# Patient Record
Sex: FEMALE | Race: WHITE | URBAN - METROPOLITAN AREA
[De-identification: names, ages, dates, MRNs, and addresses within clinical notes are randomized per-mention and may not be internally consistent; named-entity substitution may affect disease eponyms.]

---

## 2019-01-09 ENCOUNTER — APPOINTMENT (RX ONLY)
Dept: URBAN - METROPOLITAN AREA CLINIC 39 | Facility: CLINIC | Age: 54
Setting detail: DERMATOLOGY
End: 2019-01-09

## 2019-01-09 DIAGNOSIS — L81.7 PIGMENTED PURPURIC DERMATOSIS: ICD-10-CM

## 2019-01-09 DIAGNOSIS — L57.8 OTHER SKIN CHANGES DUE TO CHRONIC EXPOSURE TO NONIONIZING RADIATION: ICD-10-CM

## 2019-01-09 DIAGNOSIS — L73.8 OTHER SPECIFIED FOLLICULAR DISORDERS: ICD-10-CM

## 2019-01-09 DIAGNOSIS — L82.1 OTHER SEBORRHEIC KERATOSIS: ICD-10-CM

## 2019-01-09 DIAGNOSIS — L91.8 OTHER HYPERTROPHIC DISORDERS OF THE SKIN: ICD-10-CM

## 2019-01-09 DIAGNOSIS — L81.4 OTHER MELANIN HYPERPIGMENTATION: ICD-10-CM

## 2019-01-09 DIAGNOSIS — D18.0 HEMANGIOMA: ICD-10-CM

## 2019-01-09 DIAGNOSIS — D22 MELANOCYTIC NEVI: ICD-10-CM

## 2019-01-09 PROBLEM — I10 ESSENTIAL (PRIMARY) HYPERTENSION: Status: ACTIVE | Noted: 2019-01-09

## 2019-01-09 PROBLEM — D22.5 MELANOCYTIC NEVI OF TRUNK: Status: ACTIVE | Noted: 2019-01-09

## 2019-01-09 PROBLEM — D48.5 NEOPLASM OF UNCERTAIN BEHAVIOR OF SKIN: Status: ACTIVE | Noted: 2019-01-09

## 2019-01-09 PROBLEM — D18.01 HEMANGIOMA OF SKIN AND SUBCUTANEOUS TISSUE: Status: ACTIVE | Noted: 2019-01-09

## 2019-01-09 PROCEDURE — ? COUNSELING

## 2019-01-09 PROCEDURE — 99203 OFFICE O/P NEW LOW 30 MIN: CPT | Mod: 25

## 2019-01-09 PROCEDURE — ? BIOPSY BY SHAVE METHOD

## 2019-01-09 PROCEDURE — ? PRESCRIPTION

## 2019-01-09 PROCEDURE — 11102 TANGNTL BX SKIN SINGLE LES: CPT

## 2019-01-09 RX ORDER — TRETINOIN 1 MG/G
CREAM TOPICAL QHS
Qty: 1 | Refills: 3 | Status: ERX | COMMUNITY
Start: 2019-01-09

## 2019-01-09 RX ORDER — TRIAMCINOLONE ACETONIDE 1 MG/G
CREAM TOPICAL BID
Qty: 1 | Refills: 1 | Status: ERX | COMMUNITY
Start: 2019-01-09

## 2019-01-09 RX ADMIN — TRETINOIN 1: 1 CREAM TOPICAL at 00:00

## 2019-01-09 RX ADMIN — TRIAMCINOLONE ACETONIDE 1: 1 CREAM TOPICAL at 00:00

## 2019-01-09 ASSESSMENT — LOCATION DETAILED DESCRIPTION DERM
LOCATION DETAILED: LEFT SUPERIOR LATERAL MIDBACK
LOCATION DETAILED: LEFT DISTAL PRETIBIAL REGION
LOCATION DETAILED: LEFT INFERIOR CENTRAL MALAR CHEEK
LOCATION DETAILED: RIGHT DISTAL PRETIBIAL REGION
LOCATION DETAILED: RIGHT MEDIAL UPPER BACK
LOCATION DETAILED: LEFT MEDIAL MALAR CHEEK
LOCATION DETAILED: RIGHT SUPERIOR MEDIAL MIDBACK
LOCATION DETAILED: RIGHT ANTERIOR SHOULDER
LOCATION DETAILED: RIGHT INFERIOR CENTRAL MALAR CHEEK
LOCATION DETAILED: LEFT SUPERIOR MEDIAL UPPER BACK

## 2019-01-09 ASSESSMENT — LOCATION SIMPLE DESCRIPTION DERM
LOCATION SIMPLE: RIGHT SHOULDER
LOCATION SIMPLE: RIGHT LOWER BACK
LOCATION SIMPLE: LEFT PRETIBIAL REGION
LOCATION SIMPLE: RIGHT PRETIBIAL REGION
LOCATION SIMPLE: RIGHT UPPER BACK
LOCATION SIMPLE: LEFT CHEEK
LOCATION SIMPLE: LEFT UPPER BACK
LOCATION SIMPLE: RIGHT CHEEK
LOCATION SIMPLE: LEFT LOWER BACK

## 2019-01-09 ASSESSMENT — SEVERITY ASSESSMENT: SEVERITY: MILD

## 2019-01-09 ASSESSMENT — LOCATION ZONE DERM
LOCATION ZONE: ARM
LOCATION ZONE: FACE
LOCATION ZONE: LEG
LOCATION ZONE: TRUNK

## 2019-01-09 ASSESSMENT — PAIN INTENSITY VAS: HOW INTENSE IS YOUR PAIN 0 BEING NO PAIN, 10 BEING THE MOST SEVERE PAIN POSSIBLE?: NO PAIN

## 2019-01-09 NOTE — PROCEDURE: BIOPSY BY SHAVE METHOD
Additional Anesthesia Volume In Cc (Will Not Render If 0): 0
Anesthesia Type: 1% lidocaine without epinephrine
Electrodesiccation Text: The wound bed was treated with electrodesiccation after the biopsy was performed.
Depth Of Biopsy: dermis
Wound Care: Vaseline
Bill 30455 For Specimen Handling/Conveyance To Laboratory?: no
Lab Facility: [id number]
Render Post-Care Instructions In Note?: yes
Type Of Destruction Used: Curettage
Biopsy Type: H and E
Anesthesia Volume In Cc (Will Not Render If 0): 0.6
Electrodesiccation And Curettage Text: The wound bed was treated with electrodesiccation and curettage after the biopsy was performed.
Billing Type: Third-Party Bill
Post-Care Instructions: I reviewed with the patient in detail post-care instructions. Patient is to keep the biopsy site dry overnight, and then apply vaseline twice daily until healed.
Silver Nitrate Text: The wound bed was treated with silver nitrate after the biopsy was performed.
Dressing: no dressing applied
Hemostasis: Drysol
Lab: [id number]
Detail Level: Detailed
Biopsy Method: Personna blade
Notification Instructions: Patient will be notified of biopsy results. However, patient instructed to call the office if not contacted within 2 weeks.
Curettage Text: The wound bed was treated with curettage after the biopsy was performed.
Consent: Verbal consent was obtained and risks were reviewed including but not limited to scarring, infection, bleeding, scabbing, incomplete removal, nerve damage and allergy to anesthesia.
Cryotherapy Text: The wound bed was treated with cryotherapy after the biopsy was performed.

## 2022-04-08 ENCOUNTER — HOSPITAL ENCOUNTER (OUTPATIENT)
Dept: RADIOLOGY | Facility: HOSPITAL | Age: 57
Discharge: HOME | End: 2022-04-08
Attending: FAMILY MEDICINE
Payer: COMMERCIAL

## 2022-04-08 ENCOUNTER — OFFICE VISIT (OUTPATIENT)
Dept: FAMILY MEDICINE | Facility: CLINIC | Age: 57
End: 2022-04-08
Payer: COMMERCIAL

## 2022-04-08 VITALS
TEMPERATURE: 97.7 F | DIASTOLIC BLOOD PRESSURE: 92 MMHG | RESPIRATION RATE: 17 BRPM | HEART RATE: 97 BPM | OXYGEN SATURATION: 99 % | SYSTOLIC BLOOD PRESSURE: 130 MMHG

## 2022-04-08 DIAGNOSIS — E03.9 HYPOTHYROIDISM, UNSPECIFIED TYPE: ICD-10-CM

## 2022-04-08 DIAGNOSIS — M79.605 LEFT LEG PAIN: ICD-10-CM

## 2022-04-08 DIAGNOSIS — Z00.00 ENCOUNTER FOR WELLNESS EXAMINATION: Primary | ICD-10-CM

## 2022-04-08 PROBLEM — E78.2 MIXED HYPERLIPIDEMIA: Status: ACTIVE | Noted: 2022-04-08

## 2022-04-08 PROBLEM — I10 PRIMARY HYPERTENSION: Status: ACTIVE | Noted: 2018-10-18

## 2022-04-08 PROBLEM — M87.059 AVASCULAR NECROSIS OF FEMORAL HEAD (CMS/HCC): Status: ACTIVE | Noted: 2022-04-08

## 2022-04-08 PROBLEM — J45.20 MILD INTERMITTENT ASTHMA: Status: ACTIVE | Noted: 2022-04-08

## 2022-04-08 PROBLEM — G47.9 SLEEP DISORDER: Status: ACTIVE | Noted: 2022-04-08

## 2022-04-08 PROBLEM — Z90.13 HISTORY OF BILATERAL MASTECTOMY: Status: ACTIVE | Noted: 2018-12-17

## 2022-04-08 PROCEDURE — 90471 IMMUNIZATION ADMIN: CPT | Performed by: FAMILY MEDICINE

## 2022-04-08 PROCEDURE — 90750 HZV VACC RECOMBINANT IM: CPT | Performed by: FAMILY MEDICINE

## 2022-04-08 PROCEDURE — 73610 X-RAY EXAM OF ANKLE: CPT | Mod: LT

## 2022-04-08 PROCEDURE — 99386 PREV VISIT NEW AGE 40-64: CPT | Mod: 25 | Performed by: FAMILY MEDICINE

## 2022-04-08 PROCEDURE — 3075F SYST BP GE 130 - 139MM HG: CPT | Performed by: FAMILY MEDICINE

## 2022-04-08 PROCEDURE — 3080F DIAST BP >= 90 MM HG: CPT | Performed by: FAMILY MEDICINE

## 2022-04-08 PROCEDURE — 73590 X-RAY EXAM OF LOWER LEG: CPT | Mod: LT

## 2022-04-08 RX ORDER — LEVOTHYROXINE SODIUM 88 UG/1
TABLET ORAL
COMMUNITY
Start: 2015-09-07 | End: 2022-04-08 | Stop reason: SDUPTHER

## 2022-04-08 RX ORDER — FLUCONAZOLE 150 MG/1
TABLET ORAL
COMMUNITY
Start: 2022-01-30 | End: 2022-04-08 | Stop reason: ALTCHOICE

## 2022-04-08 RX ORDER — CLONIDINE HYDROCHLORIDE 0.1 MG/1
TABLET ORAL
COMMUNITY
End: 2024-01-26 | Stop reason: SDUPTHER

## 2022-04-08 RX ORDER — TRAZODONE HYDROCHLORIDE 100 MG/1
TABLET ORAL
COMMUNITY
Start: 2022-03-23 | End: 2023-09-14 | Stop reason: SDUPTHER

## 2022-04-08 RX ORDER — VENLAFAXINE 100 MG/1
150 TABLET ORAL 2 TIMES DAILY
COMMUNITY
Start: 2015-04-07 | End: 2023-08-04 | Stop reason: SDUPTHER

## 2022-04-08 RX ORDER — ALBUTEROL SULFATE 90 UG/1
INHALANT RESPIRATORY (INHALATION)
COMMUNITY
Start: 2015-09-07 | End: 2022-11-30

## 2022-04-08 RX ORDER — TIZANIDINE 4 MG/1
4 TABLET ORAL EVERY 6 HOURS PRN
COMMUNITY
End: 2022-10-19

## 2022-04-08 RX ORDER — METOPROLOL SUCCINATE 25 MG/1
25 TABLET, EXTENDED RELEASE ORAL DAILY
COMMUNITY
End: 2022-04-08 | Stop reason: CLARIF

## 2022-04-08 RX ORDER — LORAZEPAM 0.5 MG/1
TABLET ORAL
COMMUNITY
Start: 2015-04-07 | End: 2023-02-07 | Stop reason: SDUPTHER

## 2022-04-08 RX ORDER — LOSARTAN POTASSIUM 100 MG/1
TABLET ORAL
COMMUNITY
Start: 2015-09-07 | End: 2022-05-13 | Stop reason: SDUPTHER

## 2022-04-08 RX ORDER — METOPROLOL TARTRATE 25 MG/1
TABLET, FILM COATED ORAL 2 TIMES DAILY
COMMUNITY
End: 2022-04-15 | Stop reason: SDUPTHER

## 2022-04-08 RX ORDER — LEVOTHYROXINE SODIUM 88 UG/1
TABLET ORAL
Qty: 90 TABLET | Refills: 0 | Status: SHIPPED | OUTPATIENT
Start: 2022-04-08 | End: 2022-05-13 | Stop reason: SDUPTHER

## 2022-04-08 NOTE — PATIENT INSTRUCTIONS
Let us know what you would like to do with PAP tests moving forward     Have blood work done at your earliest convenience, we will contact you with results.  You should be fasting for the labs, do not eat anything for at least 8 to 10 hours prior to when you'll have the labs drawn, it is okay to drink water during your fasting period.

## 2022-04-08 NOTE — PROGRESS NOTES
Subjective      Patient ID: Marita Calderon is a 56 y.o. female is here for the following:    Patient presents for a healthcare maintenance visit.    She denies current symptoms including HA, CP, palpitations, SOB, N/V/D, urinary symptoms    PMH   There is no problem list on file for this patient.    Past Surgical History:  No date: HYSTERECTOMY  No date: MASTECTOMY  No date: TONSILLECTOMY  Medications: see med list  Allergies: see allergy list    No family history on file.    Social history    Tobacco?: No    Alcohol?: Yes - Socially  Illicit drugs: No  Work: Travels with     Marital status/family:    Diet: Balanced  Number of days/week of moderate or greater intensity exercise?: 0   On those days, minutes spent at that activity level?: 0    Eye Doctor: Sees regularly       Dentist: Sees regularly     Screenings  Colonoscopy/FIT (45-74 y/o): up to date - cologuard negative last year     Mammogram: declined  PAP (21-66 y/o): declined today  DEXA: N/A    Lung CA screening (55-79 y/o w/ 30 pack-year hx AND currently smoke OR quit within 15 years): N/A     Labs  Lipids: ordered today   FBS/a1c: ordered today                                                Vaccines     Influenza: Up to date  Tdap: Up to date  Pneumovax: N/A Prevnar: N/A      Shingrix/Zostavax (50+): Overdue - ordered today   COVID: Up to date      The following have been reviewed and updated as appropriate in this visit:            There is no problem list on file for this patient.      Social History     Tobacco Use   • Smoking status: Former Smoker     Packs/day: 1.00     Years: 25.00     Pack years: 25.00     Quit date:      Years since quittin.2   • Smokeless tobacco: Never Used   Substance Use Topics   • Alcohol use: Yes     Comment: socially   • Drug use: Never       Allergies as of 2022 - Reviewed 2022   Allergen Reaction Noted   • Codeine  2022         Current Outpatient Medications:   •  albuterol HFA  (VENTOLIN HFA) 90 mcg/actuation inhaler, ProAir HFA 90 mcg/actuation aerosol inhaler  INHALE 2 PUFFS BY MOUTH EVERY 4 HOURS, Disp: , Rfl:   •  cloNIDine (CATAPRES) 0.1 mg tablet, clonidine HCl 0.1 mg tablet, Disp: , Rfl:   •  fluconazole (DIFLUCAN) 150 mg tablet, TAKE 1 TABLET BY MOUTH ONCE, Disp: , Rfl:   •  levothyroxine (SYNTHROID) 88 mcg tablet, levothyroxine 88 mcg tablet, Disp: , Rfl:   •  LORazepam (ATIVAN) 0.5 mg tablet, lorazepam 0.5 mg tablet, Disp: , Rfl:   •  losartan (COZAAR) 100 mg tablet, losartan 100 mg tablet, Disp: , Rfl:   •  metoprolol succinate XL (TOPROL-XL) 25 mg 24 hr tablet, Take 25 mg by mouth daily., Disp: , Rfl:   •  tiZANidine (ZANAFLEX) 4 mg tablet, Take 4 mg by mouth every 6 (six) hours as needed for muscle spasms., Disp: , Rfl:   •  traZODone (DESYREL) 100 mg tablet, , Disp: , Rfl:   •  venlafaxine (EFFEXOR) 100 mg tablet, , Disp: , Rfl:     Review of systems  Per HPI.    Objective   Vitals:    04/08/22 1137   BP: (!) 130/92   BP Location: Left upper arm   Patient Position: Sitting   Pulse: 97   Resp: 17   Temp: 36.5 °C (97.7 °F)   TempSrc: Temporal   SpO2: 99%     There is no height or weight on file to calculate BMI.    Physical Exam  Vitals reviewed.   Constitutional:       General: She is not in acute distress.     Appearance: She is well-developed.   HENT:      Right Ear: Tympanic membrane, ear canal and external ear normal.      Left Ear: Tympanic membrane, ear canal and external ear normal.   Eyes:      Pupils: Pupils are equal, round, and reactive to light.      Funduscopic exam:     Right eye: No hemorrhage or papilledema.         Left eye: No hemorrhage or papilledema.   Neck:      Thyroid: No thyromegaly.   Cardiovascular:      Rate and Rhythm: Normal rate and regular rhythm.      Heart sounds: Normal heart sounds.   Pulmonary:      Effort: Pulmonary effort is normal. No respiratory distress.      Breath sounds: Normal breath sounds.   Abdominal:      General: Bowel sounds  are normal. There is no distension.      Palpations: Abdomen is soft.      Tenderness: There is no abdominal tenderness.   Musculoskeletal:      Cervical back: Normal range of motion and neck supple.   Skin:     General: Skin is warm and dry.      Capillary Refill: Capillary refill takes less than 2 seconds.      Findings: No erythema.   Psychiatric:         Mood and Affect: Mood normal.         Behavior: Behavior normal.         Thought Content: Thought content normal.         Judgment: Judgment normal.       No images are attached to the encounter.     Assessment/Plan   Problem List Items Addressed This Visit        Endocrine/Metabolic    Hypothyroidism  Has been irregularly taking mediation recently  Asymptomatic     -Check labs     Relevant Medications    metoprolol tartrate (LOPRESSOR) 25 mg tablet    levothyroxine (SYNTHROID) 88 mcg tablet    Other Relevant Orders    TSH w reflex FT4      Other Visit Diagnoses     Encounter for wellness examination    -  Primary  Feels well overall, no new complaints   No medication changes   Up to date on vaccines - due for PAP  Screening labs ordered    -Follow-up yearly for wellness exams and sooner as needed      Relevant Orders    CBC and Differential    Comprehensive metabolic panel    Lipid panel    Left leg pain        Relevant Orders    X-RAY ANKLE LEFT 3+ VIEWS    X-RAY TIBIA FIBULA LEFT 2 VIEWS          I spent 30 minutes on this date of service performing the following activities: obtaining history, performing examination, entering orders, documenting and providing counseling and education.      Jack Hensley MD  4/8/2022

## 2022-04-14 ENCOUNTER — TELEPHONE (OUTPATIENT)
Dept: FAMILY MEDICINE | Facility: CLINIC | Age: 57
End: 2022-04-14
Payer: COMMERCIAL

## 2022-04-14 DIAGNOSIS — I10 PRIMARY HYPERTENSION: Primary | ICD-10-CM

## 2022-04-14 NOTE — TELEPHONE ENCOUNTER
Pt needs refill on metoprolol, only has two days remaining of medication.Please sen script to [facility]    Do you have enough medication for the next 5 days?:     Did you request this medication through your pharmacy or our patient portal in the last day or two?    Name of medication requested: metoprolol tartrate (LOPRESSOR)   Medication Strength: 25 mg tablet  Mediation Directions:   Quantity Requested (example 30/90):   Number of refills requested:       System Generated Preferred Pharmacy(s)  are as follows.  If more than one pharmacy displays please identify which one should be used for this call    Has the pharmacy information below been confirmed with the patient?         [id number] - [place], [place] - [address] [address]  Phone: [phone] Fax: [phone]    [id number] - [place] - [address] [facility] [address]  [address] [address]  Phone: [phone] Fax: [phone]          If necessary, provide pharmacy details below.     Is this pharmacy a mail order pharmacy?:   Pharmacy Name:   Pharmacy City:   Pharmacy Telephone:     Additional Comments:    Next Encounter with this provider: Visit date not found

## 2022-04-15 RX ORDER — METOPROLOL TARTRATE 25 MG/1
25 TABLET, FILM COATED ORAL 2 TIMES DAILY
Qty: 180 TABLET | Refills: 3 | Status: SHIPPED | OUTPATIENT
Start: 2022-04-15 | End: 2022-05-13 | Stop reason: SDUPTHER

## 2022-05-12 ENCOUNTER — TELEPHONE (OUTPATIENT)
Dept: FAMILY MEDICINE | Facility: CLINIC | Age: 57
End: 2022-05-12
Payer: COMMERCIAL

## 2022-05-12 DIAGNOSIS — I10 PRIMARY HYPERTENSION: ICD-10-CM

## 2022-05-12 DIAGNOSIS — E03.9 HYPOTHYROIDISM, UNSPECIFIED TYPE: Primary | ICD-10-CM

## 2022-05-12 NOTE — TELEPHONE ENCOUNTER
Has 5 days worth    Did not requested through the pharmacy or portal      Losartan  100mg  One daily in AM    Levothyroxine  .088mg  One daily in AM      [facility] #[id number] - [place] - [address] AT [facility] [address]  [address] [address]  Phone: [phone] Fax: [phone]

## 2022-05-13 ENCOUNTER — TELEPHONE (OUTPATIENT)
Dept: FAMILY MEDICINE | Facility: CLINIC | Age: 57
End: 2022-05-13
Payer: COMMERCIAL

## 2022-05-13 DIAGNOSIS — E03.9 HYPOTHYROIDISM, UNSPECIFIED TYPE: ICD-10-CM

## 2022-05-13 DIAGNOSIS — I10 PRIMARY HYPERTENSION: ICD-10-CM

## 2022-05-13 RX ORDER — LOSARTAN POTASSIUM 100 MG/1
TABLET ORAL
Qty: 90 TABLET | Refills: 3 | Status: SHIPPED | OUTPATIENT
Start: 2022-05-13 | End: 2022-11-30 | Stop reason: SDUPTHER

## 2022-05-13 RX ORDER — LEVOTHYROXINE SODIUM 88 UG/1
88 TABLET ORAL
Qty: 90 TABLET | Refills: 3 | Status: SHIPPED | OUTPATIENT
Start: 2022-05-13 | End: 2022-11-30 | Stop reason: SDUPTHER

## 2022-05-13 RX ORDER — LEVOTHYROXINE SODIUM 88 UG/1
TABLET ORAL
Qty: 90 TABLET | Refills: 3 | Status: SHIPPED | OUTPATIENT
Start: 2022-05-13 | End: 2022-05-13 | Stop reason: SDUPTHER

## 2022-05-13 RX ORDER — METOPROLOL TARTRATE 25 MG/1
25 TABLET, FILM COATED ORAL 2 TIMES DAILY
Qty: 180 TABLET | Refills: 3 | Status: SHIPPED | OUTPATIENT
Start: 2022-05-13 | End: 2022-11-30 | Stop reason: SDUPTHER

## 2022-05-16 ENCOUNTER — OFFICE VISIT (OUTPATIENT)
Dept: FAMILY MEDICINE | Facility: CLINIC | Age: 57
End: 2022-05-16
Payer: COMMERCIAL

## 2022-05-16 VITALS
HEART RATE: 84 BPM | TEMPERATURE: 98.3 F | OXYGEN SATURATION: 99 % | DIASTOLIC BLOOD PRESSURE: 78 MMHG | HEIGHT: 65 IN | SYSTOLIC BLOOD PRESSURE: 122 MMHG | WEIGHT: 172.2 LBS | BODY MASS INDEX: 28.69 KG/M2 | RESPIRATION RATE: 16 BRPM

## 2022-05-16 DIAGNOSIS — Z12.31 ENCOUNTER FOR SCREENING MAMMOGRAM FOR MALIGNANT NEOPLASM OF BREAST: ICD-10-CM

## 2022-05-16 DIAGNOSIS — Z01.419 ENCOUNTER FOR GYNECOLOGICAL EXAMINATION: Primary | ICD-10-CM

## 2022-05-16 DIAGNOSIS — Z12.4 CERVICAL CANCER SCREENING: ICD-10-CM

## 2022-05-16 PROCEDURE — S0612 ANNUAL GYNECOLOGICAL EXAMINA: HCPCS | Performed by: FAMILY MEDICINE

## 2022-05-16 NOTE — PROGRESS NOTES
Subjective      Patient ID: Marita Calderon is a 56 y.o. female is here for the following:    Women's wellness exam  - History of hysterectomy in her 20s, she is unsure if she has her cervix but states that she did have an oophorectomy at the time  - No breast cancer history, though she does have fibrocystic disease and has had bilateral breast implants since she was in her 20s.  She states that she cannot tolerate normal mammograms.  - Currently postmenopausal  - Occasional urinary incontinence that is worsening      The following have been reviewed and updated as appropriate in this visit:   Tobacco  Allergies  Meds  Problems  Med Hx  Surg Hx  Fam Hx           Patient Active Problem List   Diagnosis   • Sleep disorder   • Avascular necrosis of femoral head (CMS/HCC)   • History of bilateral mastectomy   • Primary hypertension   • Hypothyroidism   • Mild intermittent asthma   • Mixed hyperlipidemia       Social History     Tobacco Use   • Smoking status: Former Smoker     Packs/day: 1.00     Years: 25.00     Pack years: 25.00     Quit date:      Years since quittin.3   • Smokeless tobacco: Never Used   Substance Use Topics   • Alcohol use: Yes     Comment: socially   • Drug use: Never       Allergies as of 2022 - Reviewed 2022   Allergen Reaction Noted   • Codeine  2022         Current Outpatient Medications:   •  albuterol HFA (VENTOLIN HFA) 90 mcg/actuation inhaler, ProAir HFA 90 mcg/actuation aerosol inhaler  INHALE 2 PUFFS BY MOUTH EVERY 4 HOURS, Disp: , Rfl:   •  cloNIDine (CATAPRES) 0.1 mg tablet, clonidine HCl 0.1 mg tablet, Disp: , Rfl:   •  levothyroxine (SYNTHROID) 88 mcg tablet, Take 1 tablet (88 mcg total) by mouth daily. levothyroxine 88 mcg tablet, Disp: 90 tablet, Rfl: 3  •  LORazepam (ATIVAN) 0.5 mg tablet, lorazepam 0.5 mg tablet, Disp: , Rfl:   •  losartan (COZAAR) 100 mg tablet, losartan 100 mg tablet, Disp: 90 tablet, Rfl: 3  •  metoprolol tartrate (LOPRESSOR) 25 mg  "tablet, Take 1 tablet (25 mg total) by mouth 2 (two) times a day., Disp: 180 tablet, Rfl: 3  •  tiZANidine (ZANAFLEX) 4 mg tablet, Take 4 mg by mouth every 6 (six) hours as needed for muscle spasms., Disp: , Rfl:   •  traZODone (DESYREL) 100 mg tablet, , Disp: , Rfl:   •  venlafaxine (EFFEXOR) 100 mg tablet, 150 mg 2 (two) times a day. , Disp: , Rfl:     Review of systems  Per HPI.    Objective   Vitals:    05/16/22 1600   BP: 122/78   BP Location: Left upper arm   Patient Position: Sitting   Pulse: 84   Resp: 16   Temp: 36.8 °C (98.3 °F)   TempSrc: Temporal   SpO2: 99%   Weight: 78.1 kg (172 lb 3.2 oz)   Height: 1.66 m (5' 5.35\")     Body mass index is 28.35 kg/m².    Physical Exam  Vitals reviewed. Exam conducted with a chaperone present (Marlene Louis present for GYN exam and breast exam).   Constitutional:       General: She is not in acute distress.     Appearance: Normal appearance. She is normal weight. She is not ill-appearing or toxic-appearing.   Genitourinary:     General: Normal vulva.      Vagina: No vaginal discharge.      Comments: Cervix not clearly visualized secondary to discomfort  Skin:     Comments: Breasts normal bilaterally, implants palpated bilaterally  No overlying skin changes, nipple discharge, nipple inversion   Neurological:      Mental Status: She is alert.       No images are attached to the encounter.     Assessment/Plan   Problem List Items Addressed This Visit    None     Visit Diagnoses     Encounter for gynecological examination    -  Primary  Normal breast exam  Cervix not clearly identified secondary discomfort during exam    - Pap test sent, we will follow-up with results and she will follow-up with GYN if needed given her complicated history    Encounter for screening mammogram for malignant neoplasm of breast        Relevant Orders    BI SCREENING MAMMOGRAM BILATERAL(TOMOSYNTHESIS)    Cervical cancer screening        Relevant Orders    Cytology, Thinprep Pap          I " spent 30 minutes on this date of service performing the following activities: obtaining history, performing examination, entering orders, documenting and providing counseling and education.      Jack Hensley MD  5/16/2022    Portions of the above dictation were performed using Dragon dictation software.  Please excuse any typos or grammatical errors

## 2022-05-19 LAB
CYTOLOGIST CVX/VAG CYTO: NORMAL
CYTOLOGY CVX/VAG DOC CYTO: NORMAL
CYTOLOGY CVX/VAG DOC THIN PREP: NORMAL
DX ICD CODE: NORMAL
HPV I/H RISK 4 DNA CVX QL PROBE+SIG AMP: NEGATIVE
LAB CORP NOTE:: NORMAL
LAB CORP QC REVIEWED BY:: NORMAL
OTHER STN SPEC: NORMAL
STAT OF ADQ CVX/VAG CYTO-IMP: NORMAL

## 2022-05-21 LAB
ALBUMIN SERPL-MCNC: 5 G/DL (ref 3.8–4.9)
ALBUMIN/GLOB SERPL: 3.1 {RATIO} (ref 1.2–2.2)
ALP SERPL-CCNC: 99 IU/L (ref 44–121)
ALT SERPL-CCNC: 17 IU/L (ref 0–32)
AST SERPL-CCNC: 23 IU/L (ref 0–40)
BASOPHILS # BLD AUTO: 0 X10E3/UL (ref 0–0.2)
BASOPHILS NFR BLD AUTO: 1 %
BILIRUB SERPL-MCNC: 0.2 MG/DL (ref 0–1.2)
BUN SERPL-MCNC: 12 MG/DL (ref 6–24)
BUN/CREAT SERPL: 15 (ref 9–23)
CALCIUM SERPL-MCNC: 9.7 MG/DL (ref 8.7–10.2)
CHLORIDE SERPL-SCNC: 101 MMOL/L (ref 96–106)
CHOLEST SERPL-MCNC: 300 MG/DL (ref 100–199)
CO2 SERPL-SCNC: 20 MMOL/L (ref 20–29)
CREAT SERPL-MCNC: 0.79 MG/DL (ref 0.57–1)
EGFRCR SERPLBLD CKD-EPI 2021: 87 ML/MIN/1.73
EOSINOPHIL # BLD AUTO: 0.1 X10E3/UL (ref 0–0.4)
EOSINOPHIL NFR BLD AUTO: 1 %
ERYTHROCYTE [DISTWIDTH] IN BLOOD BY AUTOMATED COUNT: 11.6 % (ref 11.7–15.4)
GLOBULIN SER CALC-MCNC: 1.6 G/DL (ref 1.5–4.5)
GLUCOSE SERPL-MCNC: 104 MG/DL (ref 65–99)
HCT VFR BLD AUTO: 38.2 % (ref 34–46.6)
HDLC SERPL-MCNC: 79 MG/DL
HGB BLD-MCNC: 13.5 G/DL (ref 11.1–15.9)
IMM GRANULOCYTES # BLD AUTO: 0 X10E3/UL (ref 0–0.1)
IMM GRANULOCYTES NFR BLD AUTO: 0 %
LDLC SERPL CALC-MCNC: 189 MG/DL (ref 0–99)
LYMPHOCYTES # BLD AUTO: 2.5 X10E3/UL (ref 0.7–3.1)
LYMPHOCYTES NFR BLD AUTO: 46 %
MCH RBC QN AUTO: 34.1 PG (ref 26.6–33)
MCHC RBC AUTO-ENTMCNC: 35.3 G/DL (ref 31.5–35.7)
MCV RBC AUTO: 97 FL (ref 79–97)
MONOCYTES # BLD AUTO: 0.4 X10E3/UL (ref 0.1–0.9)
MONOCYTES NFR BLD AUTO: 7 %
NEUTROPHILS # BLD AUTO: 2.5 X10E3/UL (ref 1.4–7)
NEUTROPHILS NFR BLD AUTO: 45 %
PLATELET # BLD AUTO: 264 X10E3/UL (ref 150–450)
POTASSIUM SERPL-SCNC: 5.3 MMOL/L (ref 3.5–5.2)
PROT SERPL-MCNC: 6.6 G/DL (ref 6–8.5)
RBC # BLD AUTO: 3.96 X10E6/UL (ref 3.77–5.28)
SODIUM SERPL-SCNC: 139 MMOL/L (ref 134–144)
T4 FREE SERPL-MCNC: 1.35 NG/DL (ref 0.82–1.77)
TRIGL SERPL-MCNC: 175 MG/DL (ref 0–149)
TSH SERPL DL<=0.005 MIU/L-ACNC: 1.06 UIU/ML (ref 0.45–4.5)
VLDLC SERPL CALC-MCNC: 32 MG/DL (ref 5–40)
WBC # BLD AUTO: 5.5 X10E3/UL (ref 3.4–10.8)

## 2022-10-19 RX ORDER — TIZANIDINE 4 MG/1
TABLET ORAL
Qty: 270 TABLET | Refills: 3 | Status: SHIPPED | OUTPATIENT
Start: 2022-10-19 | End: 2023-11-29

## 2022-11-30 DIAGNOSIS — I10 PRIMARY HYPERTENSION: ICD-10-CM

## 2022-11-30 DIAGNOSIS — E03.9 HYPOTHYROIDISM, UNSPECIFIED TYPE: ICD-10-CM

## 2022-11-30 RX ORDER — FLUTICASONE PROPIONATE 50 MCG
SPRAY, SUSPENSION (ML) NASAL
Qty: 16 G | Refills: 11 | Status: SHIPPED | OUTPATIENT
Start: 2022-11-30 | End: 2023-12-04

## 2022-11-30 RX ORDER — METOPROLOL TARTRATE 25 MG/1
25 TABLET, FILM COATED ORAL 2 TIMES DAILY
Qty: 180 TABLET | Refills: 0 | Status: SHIPPED | OUTPATIENT
Start: 2022-11-30 | End: 2023-03-16

## 2022-11-30 RX ORDER — ALBUTEROL SULFATE 90 UG/1
INHALANT RESPIRATORY (INHALATION)
Qty: 25.5 G | Refills: 3 | Status: SHIPPED | OUTPATIENT
Start: 2022-11-30

## 2022-11-30 RX ORDER — LEVOTHYROXINE SODIUM 88 UG/1
88 TABLET ORAL
Qty: 90 TABLET | Refills: 0 | Status: SHIPPED | OUTPATIENT
Start: 2022-11-30 | End: 2023-02-13

## 2022-11-30 RX ORDER — LOSARTAN POTASSIUM 100 MG/1
TABLET ORAL
Qty: 90 TABLET | Refills: 0 | Status: SHIPPED | OUTPATIENT
Start: 2022-11-30 | End: 2022-12-07 | Stop reason: SDUPTHER

## 2022-12-07 ENCOUNTER — TELEPHONE (OUTPATIENT)
Dept: FAMILY MEDICINE | Facility: CLINIC | Age: 57
End: 2022-12-07
Payer: COMMERCIAL

## 2022-12-07 DIAGNOSIS — I10 PRIMARY HYPERTENSION: ICD-10-CM

## 2022-12-07 RX ORDER — LOSARTAN POTASSIUM 100 MG/1
100 TABLET ORAL DAILY
Qty: 90 TABLET | Refills: 3 | Status: SHIPPED | OUTPATIENT
Start: 2022-12-07 | End: 2024-01-26 | Stop reason: SDUPTHER

## 2022-12-07 NOTE — TELEPHONE ENCOUNTER
[facility] called to get more specific instructions for the Losartin prescription.   They'd like to know when and how many tabs to take per day.     Call back number is   [phone]      Reference number is   [id number]  
New Rx sent  
2

## 2023-02-08 ENCOUNTER — TELEPHONE (OUTPATIENT)
Dept: FAMILY MEDICINE | Facility: CLINIC | Age: 58
End: 2023-02-08
Payer: COMMERCIAL

## 2023-02-08 RX ORDER — LORAZEPAM 0.5 MG/1
0.5 TABLET ORAL EVERY 8 HOURS PRN
Qty: 90 TABLET | Refills: 0 | Status: SHIPPED | OUTPATIENT
Start: 2023-02-08 | End: 2023-02-08 | Stop reason: SDUPTHER

## 2023-02-08 RX ORDER — LORAZEPAM 0.5 MG/1
0.5 TABLET ORAL EVERY 8 HOURS PRN
Qty: 21 TABLET | Refills: 0 | Status: SHIPPED | OUTPATIENT
Start: 2023-02-08 | End: 2023-03-09

## 2023-02-08 NOTE — TELEPHONE ENCOUNTER
REFILL LINE:     Patient left message that she will be staying with Dr. Hensley for the next year because they will not be moving.    Patient had [facility] request a refill for lorazepam yesterday but patient only has 3 days left of Lorazepam.    Can you please send a 1 week supply, 3x a day of lorazepam to [facility] and approve [facility] request?    Patient also asked for a copy of her mammogram to be sent to her new local address.    Address updated and mammogram placed in outgoing mail.

## 2023-02-13 DIAGNOSIS — E03.9 HYPOTHYROIDISM, UNSPECIFIED TYPE: ICD-10-CM

## 2023-02-13 RX ORDER — LEVOTHYROXINE SODIUM 88 UG/1
TABLET ORAL
Qty: 90 TABLET | Refills: 3 | Status: SHIPPED | OUTPATIENT
Start: 2023-02-13 | End: 2024-02-08

## 2023-03-09 RX ORDER — LORAZEPAM 0.5 MG/1
TABLET ORAL
Qty: 90 TABLET | Refills: 0 | Status: SHIPPED | OUTPATIENT
Start: 2023-03-09 | End: 2023-04-11

## 2023-03-16 DIAGNOSIS — I10 PRIMARY HYPERTENSION: ICD-10-CM

## 2023-03-16 RX ORDER — METOPROLOL TARTRATE 25 MG/1
TABLET, FILM COATED ORAL
Qty: 180 TABLET | Refills: 3 | Status: SHIPPED | OUTPATIENT
Start: 2023-03-16 | End: 2023-08-15 | Stop reason: SDUPTHER

## 2023-04-11 RX ORDER — LORAZEPAM 0.5 MG/1
TABLET ORAL
Qty: 90 TABLET | Refills: 0 | Status: SHIPPED | OUTPATIENT
Start: 2023-04-11 | End: 2023-05-08

## 2023-05-08 RX ORDER — LORAZEPAM 0.5 MG/1
TABLET ORAL
Qty: 90 TABLET | Refills: 0 | Status: SHIPPED | OUTPATIENT
Start: 2023-05-08 | End: 2023-06-05

## 2023-05-11 ENCOUNTER — TELEMEDICINE (OUTPATIENT)
Dept: FAMILY MEDICINE | Facility: CLINIC | Age: 58
End: 2023-05-11
Payer: COMMERCIAL

## 2023-05-11 DIAGNOSIS — J45.20 MILD INTERMITTENT ASTHMA WITHOUT COMPLICATION: ICD-10-CM

## 2023-05-11 DIAGNOSIS — U07.1 COVID-19: Primary | ICD-10-CM

## 2023-05-11 PROCEDURE — 99213 OFFICE O/P EST LOW 20 MIN: CPT | Mod: 95 | Performed by: FAMILY MEDICINE

## 2023-05-11 RX ORDER — PROMETHAZINE HYDROCHLORIDE AND DEXTROMETHORPHAN HYDROBROMIDE 6.25; 15 MG/5ML; MG/5ML
5 SYRUP ORAL EVERY 4 HOURS PRN
Qty: 240 ML | Refills: 0 | Status: SHIPPED | OUTPATIENT
Start: 2023-05-11 | End: 2023-05-19

## 2023-05-11 RX ORDER — PREDNISONE 50 MG/1
50 TABLET ORAL DAILY
Qty: 5 TABLET | Refills: 0 | Status: SHIPPED | OUTPATIENT
Start: 2023-05-11 | End: 2023-10-18 | Stop reason: ALTCHOICE

## 2023-05-11 NOTE — PROGRESS NOTES
Verification of Patient Location:  The patient affirms they are currently located in the following state: [place]    Request for Consent:    Audio and Video Encounter   Hello, my name is Jack Hensley MD.  Before we proceed, can you please verify your identification by telling me your full name and date of birth?  Can you tell me who is in the room with you?    You and I are about to have a telemedicine check-in or visit because you have requested it.  This is a live video-conference.  I am a real person, speaking to you in real time.  There is no one else with me on the video-conference. I am not recording this conversation and I am asking you not to record it.  This telemedicine visit will be billed to your health insurance or you, if you are self-insured.  You understand you will be responsible for any copayments or coinsurances that apply to your telemedicine visit.  Communication platform used for this encounter:  [employer] Video Visit (Epic Video Client)       Before starting our telemedicine visit, I am required to get your consent for this virtual check-in or visit by telemedicine. Do you consent?      Patient Response to Request for Consent:  Yes      Visit Documentation:  Subjective     Patient ID: Marita Calderon is a 57 y.o. female.  1965      COVID-19  - Tested positive today  - Head pressure, body aches, cough, congestion, fatigue  - No chest pain or shortness of breath  - Last had COVID about 2 years ago  - Has not used anything over-the-counter yet  - She states that last time she had COVID she improved with using her asthma inhalers and prednisone      The following have been reviewed and updated as appropriate in this visit:   Tobacco  Allergies  Meds  Problems  Med Hx  Surg Hx  Fam Hx       Review of Systems   All other systems reviewed and are negative.        Assessment/Plan   Diagnoses and all orders for this visit:    COVID-19 (Primary)  Tested positive this morning  Head  pressure, body aches, cough, congestion, fatigue  No chest pain or pressure, no shortness of breath    -Continue current asthma inhalers  -Start decongestant and Tylenol/Advil, or combo medicine like DayQuil  -Start promethazine for cough  -Follow-up as needed, ED precautions reviewed    Mild intermittent asthma without complication  Assessment & Plan:  Stable      Other orders  -     promethazine-DM (PROMETHAZINE-DM) 6.25-15 mg/5 mL syrup; Take 5 mL by mouth every 4 (four) hours as needed for cough for up to 10 days.  -     predniSONE (DELTASONE) 50 mg tablet; Take 1 tablet (50 mg total) by mouth daily for 5 days.      Time Spent:  I spent 14 minutes on this date of service performing the following activities: obtaining history, performing examination, entering orders, documenting and providing counseling and education.

## 2023-05-19 RX ORDER — PROMETHAZINE HYDROCHLORIDE AND DEXTROMETHORPHAN HYDROBROMIDE 6.25; 15 MG/5ML; MG/5ML
SYRUP ORAL
Qty: 240 ML | Refills: 0 | Status: SHIPPED | OUTPATIENT
Start: 2023-05-19 | End: 2023-07-27 | Stop reason: ALTCHOICE

## 2023-06-05 RX ORDER — LORAZEPAM 0.5 MG/1
TABLET ORAL
Qty: 90 TABLET | Refills: 0 | Status: SHIPPED | OUTPATIENT
Start: 2023-06-05 | End: 2023-08-04

## 2023-06-20 ENCOUNTER — TELEPHONE (OUTPATIENT)
Dept: FAMILY MEDICINE | Facility: CLINIC | Age: 58
End: 2023-06-20
Payer: COMMERCIAL

## 2023-06-20 NOTE — TELEPHONE ENCOUNTER
Marita called stating she is still in the [facility].  She has not received any information on her injury.  She states she has not spoken to a Dr and has repeatedly asked for a face to face consult with an Orthopaedic Specialist but was told they don't do that but and Orthopaedic Specialist has looked at x-ray and CT scan but won't talk to her in person.  She had 2 hip replacements in the last couple of years and is very concerned if those were effected.  She also has a disease that no one seems to be acknowledging.  Marita is very upset and wants to be moved to another facility.  I asked if she has spoken to  and she has but not expressed any of these concerns.  I explained Dr. Hensley can't take over her care while she is in the hospital.  She is very afraid they will send her to rehab without finding out what is really wrong with her.  Call back# [phone]

## 2023-06-20 NOTE — TELEPHONE ENCOUNTER
Spoke with patient and she reports that she fell  and thought she injured her left hip replacement. Patient was hospitalized at [facility]. She reports that she has a fractured pelvis and also reports burning pain in left femur implant. Patient reports that she has not seen ortho but an attending comes by for a few minutes every other day. Patient reports that they have started PT but she is in excruciating pain. Recommended patient sign record release and ask them to fax records to our office. Patient states they intend on sending her to rehab and she is scared d/t current treatment and PT expectations, especially since they (medical staff) do not understand her underlying condition of vascular necrosis.

## 2023-06-21 ENCOUNTER — TELEPHONE (OUTPATIENT)
Dept: FAMILY MEDICINE | Facility: CLINIC | Age: 58
End: 2023-06-21
Payer: COMMERCIAL

## 2023-06-21 NOTE — TELEPHONE ENCOUNTER
Pt requesting to be transferred to a different hospital and would like suggestions on where she should go for PT/Rehab.

## 2023-06-21 NOTE — TELEPHONE ENCOUNTER
Called and LVM recommending [facility]. Also informed patient that we have not yet received any documentation from [place].

## 2023-07-27 ENCOUNTER — OFFICE VISIT (OUTPATIENT)
Dept: FAMILY MEDICINE | Facility: CLINIC | Age: 58
End: 2023-07-27
Payer: COMMERCIAL

## 2023-07-27 VITALS
TEMPERATURE: 98.1 F | DIASTOLIC BLOOD PRESSURE: 70 MMHG | RESPIRATION RATE: 18 BRPM | SYSTOLIC BLOOD PRESSURE: 122 MMHG | OXYGEN SATURATION: 97 % | HEART RATE: 74 BPM

## 2023-07-27 DIAGNOSIS — S32.502D CLOSED FRACTURE OF LEFT PUBIS WITH ROUTINE HEALING, UNSPECIFIED PORTION OF PUBIS, SUBSEQUENT ENCOUNTER: Primary | ICD-10-CM

## 2023-07-27 DIAGNOSIS — Z12.31 BREAST CANCER SCREENING BY MAMMOGRAM: ICD-10-CM

## 2023-07-27 PROCEDURE — 3078F DIAST BP <80 MM HG: CPT | Performed by: FAMILY MEDICINE

## 2023-07-27 PROCEDURE — 99214 OFFICE O/P EST MOD 30 MIN: CPT | Performed by: FAMILY MEDICINE

## 2023-07-27 PROCEDURE — 3074F SYST BP LT 130 MM HG: CPT | Performed by: FAMILY MEDICINE

## 2023-07-27 RX ORDER — GABAPENTIN 300 MG/1
CAPSULE ORAL
COMMUNITY
Start: 2023-07-19 | End: 2023-08-13 | Stop reason: SDUPTHER

## 2023-07-27 NOTE — PROGRESS NOTES
Subjective      Patient ID: Marita Calderon is a 57 y.o. female is here for the following:    Left Pubic Fracture  -Admitted 23 for pubic fracture after a fall in a flower bed stepping off a patio   -Since then she was adimtted to a rehab facility and was recently discharged home  -Doing well with home PT   -Pain well controlled  -Following up regularly with ortho  -Has some associated neck and shoulder pain that she is working through with ortho as well      The following have been reviewed and updated as appropriate in this visit:   Tobacco  Allergies  Meds  Problems  Med Hx  Surg Hx  Fam Hx         Patient Active Problem List   Diagnosis   • Sleep disorder   • Avascular necrosis of femoral head (CMS/HCC)   • History of bilateral mastectomy   • Primary hypertension   • Hypothyroidism   • Mild intermittent asthma   • Mixed hyperlipidemia       Social History     Tobacco Use   • Smoking status: Former     Packs/day: 1.00     Years: 25.00     Pack years: 25.00     Types: Cigarettes     Quit date:      Years since quittin.5   • Smokeless tobacco: Never   Substance Use Topics   • Alcohol use: Yes     Comment: socially   • Drug use: Never       Allergies as of 2023 - Reviewed 2023   Allergen Reaction Noted   • Codeine  2022         Current Outpatient Medications:   •  albuterol HFA (VENTOLIN HFA) 90 mcg/actuation inhaler, USE 2 INHALATIONS EVERY 6 HOURS AS NEEDED FOR WHEEZING, Disp: 25.5 g, Rfl: 3  •  cloNIDine (CATAPRES) 0.1 mg tablet, clonidine HCl 0.1 mg tablet, Disp: , Rfl:   •  fluticasone propionate (FLONASE) 50 mcg/actuation nasal spray, USE 2 SPRAYS NASALLY IN THE MORNING, Disp: 16 g, Rfl: 11  •  gabapentin (NEURONTIN) 300 mg capsule, TAKE 1 CAPSULE BY MOUTH TWO TIMES DAILY AS NEEDED FOR PAIN, Disp: , Rfl:   •  levothyroxine (SYNTHROID) 88 mcg tablet, TAKE 1 TABLET DAILY, Disp: 90 tablet, Rfl: 3  •  LORazepam (ATIVAN) 0.5 mg tablet, TAKE 1 TABLET EVERY 8 HOURS AS NEEDED FOR  ANXIETY (Patient taking differently: Take 1.5 mg by mouth 2 (two) times a day. Taking 1.5 mg daily. 0.5 mg in the AM and 1 mg in the evening.), Disp: 90 tablet, Rfl: 0  •  losartan (COZAAR) 100 mg tablet, Take 1 tablet (100 mg total) by mouth daily., Disp: 90 tablet, Rfl: 3  •  tiZANidine (ZANAFLEX) 4 mg tablet, TAKE 1 TABLET THREE TIMES A DAY, Disp: 270 tablet, Rfl: 3  •  traZODone (DESYREL) 100 mg tablet, , Disp: , Rfl:   •  venlafaxine (EFFEXOR) 100 mg tablet, 150 mg 2 (two) times a day. , Disp: , Rfl:   •  metoprolol tartrate (LOPRESSOR) 25 mg tablet, TAKE 1 TABLET TWICE A DAY (Patient not taking: Reported on 7/27/2023), Disp: 180 tablet, Rfl: 3  •  predniSONE (DELTASONE) 50 mg tablet, Take 1 tablet (50 mg total) by mouth daily for 5 days., Disp: 5 tablet, Rfl: 0    Review of systems  Per HPI.    Objective   Vitals:    07/27/23 1129   BP: 122/70   BP Location: Left upper arm   Patient Position: Sitting   Pulse: 74   Resp: 18   Temp: 36.7 °C (98.1 °F)   TempSrc: Oral   SpO2: 97%     There is no height or weight on file to calculate BMI.    Physical Exam  Vitals reviewed.   Constitutional:       General: She is not in acute distress.     Appearance: She is well-developed. She is not toxic-appearing.   Cardiovascular:      Rate and Rhythm: Normal rate and regular rhythm.      Heart sounds: Normal heart sounds.   Pulmonary:      Effort: Pulmonary effort is normal. No respiratory distress.      Breath sounds: Normal breath sounds.   Musculoskeletal:      Comments: Comfortable in wheelchair    Neurological:      Mental Status: She is alert.   Psychiatric:         Mood and Affect: Mood normal.         Behavior: Behavior normal.         Thought Content: Thought content normal.         Judgment: Judgment normal.       No images are attached to the encounter.     Assessment/Plan   Problem List Items Addressed This Visit    None  Visit Diagnoses     Closed fracture of left pubis with routine healing, unspecified portion of  pubis, subsequent encounter    -  Primary  Fracture of left pubis last month  Healing well  Progressing through PT  Pain well controlled    -Continue PT and follow up with ortho    Breast cancer screening by mammogram        Relevant Orders    BI SCREENING MAMMOGRAM BILATERAL(TOMOSYNTHESIS)          I spent 30 minutes on this date of service performing the following activities: obtaining history, performing examination, entering orders, documenting and providing counseling and education.      Jack Hensley MD  7/27/2023    Portions of the above dictation were performed using Dragon dictation software.  Please excuse any typos or grammatical errors.

## 2023-08-02 ENCOUNTER — TELEPHONE (OUTPATIENT)
Dept: FAMILY MEDICINE | Facility: CLINIC | Age: 58
End: 2023-08-02
Payer: COMMERCIAL

## 2023-08-02 NOTE — TELEPHONE ENCOUNTER
[facility] calling to confirm Dr. Hensley signature on Home Health Certification and Plan of Care.  Reviewed form and confirmed for [facility].

## 2023-08-04 RX ORDER — VENLAFAXINE 100 MG/1
150 TABLET ORAL 2 TIMES DAILY
Qty: 180 TABLET | Refills: 1 | Status: SHIPPED | OUTPATIENT
Start: 2023-08-04 | End: 2023-12-04

## 2023-08-04 RX ORDER — LORAZEPAM 0.5 MG/1
1.5 TABLET ORAL
Qty: 90 TABLET | Refills: 0 | Status: SHIPPED | OUTPATIENT
Start: 2023-08-04 | End: 2023-08-08 | Stop reason: SDUPTHER

## 2023-08-04 NOTE — TELEPHONE ENCOUNTER
Medicine Refill Request    Last Office Visit: 7/27/2023   Last Consult Visit: Visit date not found  Last Telemedicine Visit: 5/11/2023 Jack Hensley MD    Next Appointment: Visit date not found      Current Outpatient Medications:   •  albuterol HFA (VENTOLIN HFA) 90 mcg/actuation inhaler, USE 2 INHALATIONS EVERY 6 HOURS AS NEEDED FOR WHEEZING, Disp: 25.5 g, Rfl: 3  •  cloNIDine (CATAPRES) 0.1 mg tablet, clonidine HCl 0.1 mg tablet, Disp: , Rfl:   •  fluticasone propionate (FLONASE) 50 mcg/actuation nasal spray, USE 2 SPRAYS NASALLY IN THE MORNING, Disp: 16 g, Rfl: 11  •  gabapentin (NEURONTIN) 300 mg capsule, TAKE 1 CAPSULE BY MOUTH TWO TIMES DAILY AS NEEDED FOR PAIN, Disp: , Rfl:   •  levothyroxine (SYNTHROID) 88 mcg tablet, TAKE 1 TABLET DAILY, Disp: 90 tablet, Rfl: 3  •  LORazepam (ATIVAN) 0.5 mg tablet, Take 3 tablets (1.5 mg total) by mouth 2 (two) times a day. Taking 1.5 mg daily. 0.5 mg in the AM and 1 mg in the evening., Disp: 90 tablet, Rfl: 0  •  losartan (COZAAR) 100 mg tablet, Take 1 tablet (100 mg total) by mouth daily., Disp: 90 tablet, Rfl: 3  •  metoprolol tartrate (LOPRESSOR) 25 mg tablet, TAKE 1 TABLET TWICE A DAY (Patient not taking: Reported on 7/27/2023), Disp: 180 tablet, Rfl: 3  •  predniSONE (DELTASONE) 50 mg tablet, Take 1 tablet (50 mg total) by mouth daily for 5 days., Disp: 5 tablet, Rfl: 0  •  tiZANidine (ZANAFLEX) 4 mg tablet, TAKE 1 TABLET THREE TIMES A DAY, Disp: 270 tablet, Rfl: 3  •  traZODone (DESYREL) 100 mg tablet, , Disp: , Rfl:   •  venlafaxine (EFFEXOR) 100 mg tablet, 150 mg 2 (two) times a day. , Disp: , Rfl:       BP Readings from Last 3 Encounters:   07/27/23 122/70   05/16/22 122/78   04/08/22 (!) 130/92       Recent Lab results:  Lab Results   Component Value Date    CHOL 300 (H) 05/20/2022   ,   Lab Results   Component Value Date    HDL 79 05/20/2022   ,   Lab Results   Component Value Date    LDLCALC 189 (H) 05/20/2022   ,   Lab Results   Component Value Date     TRIG 175 (H) 05/20/2022        Lab Results   Component Value Date    GLUCOSE 104 (H) 05/20/2022   , No results found for: HGBA1C      Lab Results   Component Value Date    CREATININE 0.79 05/20/2022       Lab Results   Component Value Date    TSH 1.060 05/20/2022         No results found for: HGBA1C

## 2023-08-08 ENCOUNTER — HOSPITAL ENCOUNTER (OUTPATIENT)
Dept: RADIOLOGY | Age: 58
Discharge: HOME | End: 2023-08-08
Attending: FAMILY MEDICINE
Payer: COMMERCIAL

## 2023-08-08 DIAGNOSIS — Z12.31 BREAST CANCER SCREENING BY MAMMOGRAM: ICD-10-CM

## 2023-08-08 PROCEDURE — 77067 SCR MAMMO BI INCL CAD: CPT

## 2023-08-08 RX ORDER — LORAZEPAM 0.5 MG/1
TABLET ORAL
Qty: 90 TABLET | Refills: 0 | Status: SHIPPED | OUTPATIENT
Start: 2023-08-08 | End: 2023-09-12

## 2023-08-10 ENCOUNTER — TRANSCRIBE ORDERS (OUTPATIENT)
Dept: SCHEDULING | Age: 58
End: 2023-08-10

## 2023-08-10 DIAGNOSIS — S43.432A SUPERIOR GLENOID LABRUM LESION OF LEFT SHOULDER, INITIAL ENCOUNTER: Primary | ICD-10-CM

## 2023-08-15 DIAGNOSIS — I10 PRIMARY HYPERTENSION: ICD-10-CM

## 2023-08-15 RX ORDER — METOPROLOL TARTRATE 25 MG/1
25 TABLET, FILM COATED ORAL 2 TIMES DAILY
Qty: 180 TABLET | Refills: 3 | Status: SHIPPED | OUTPATIENT
Start: 2023-08-15 | End: 2024-01-26 | Stop reason: SDUPTHER

## 2023-08-15 NOTE — TELEPHONE ENCOUNTER
Medicine Refill Request    Last Office Visit: 7/27/2023   Last Consult Visit: Visit date not found  Last Telemedicine Visit: 5/11/2023 Jack Hensley MD    Next Appointment: Visit date not found      Current Outpatient Medications:   •  albuterol HFA (VENTOLIN HFA) 90 mcg/actuation inhaler, USE 2 INHALATIONS EVERY 6 HOURS AS NEEDED FOR WHEEZING, Disp: 25.5 g, Rfl: 3  •  cloNIDine (CATAPRES) 0.1 mg tablet, clonidine HCl 0.1 mg tablet, Disp: , Rfl:   •  fluticasone propionate (FLONASE) 50 mcg/actuation nasal spray, USE 2 SPRAYS NASALLY IN THE MORNING, Disp: 16 g, Rfl: 11  •  gabapentin (NEURONTIN) 300 mg capsule, Take 1 capsule (300 mg total) by mouth 2 (two) times a day., Disp: 180 capsule, Rfl: 3  •  levothyroxine (SYNTHROID) 88 mcg tablet, TAKE 1 TABLET DAILY, Disp: 90 tablet, Rfl: 3  •  LORazepam (ATIVAN) 0.5 mg tablet, Take 1 tablet (0.5 mg total) by mouth every morning AND 2 tablets (1 mg total) nightly., Disp: 90 tablet, Rfl: 0  •  losartan (COZAAR) 100 mg tablet, Take 1 tablet (100 mg total) by mouth daily., Disp: 90 tablet, Rfl: 3  •  metoprolol tartrate (LOPRESSOR) 25 mg tablet, TAKE 1 TABLET TWICE A DAY (Patient not taking: Reported on 7/27/2023), Disp: 180 tablet, Rfl: 3  •  predniSONE (DELTASONE) 50 mg tablet, Take 1 tablet (50 mg total) by mouth daily for 5 days., Disp: 5 tablet, Rfl: 0  •  tiZANidine (ZANAFLEX) 4 mg tablet, TAKE 1 TABLET THREE TIMES A DAY, Disp: 270 tablet, Rfl: 3  •  traZODone (DESYREL) 100 mg tablet, , Disp: , Rfl:   •  venlafaxine (EFFEXOR) 100 mg tablet, Take 1.5 tablets (150 mg total) by mouth 2 (two) times a day., Disp: 180 tablet, Rfl: 1      BP Readings from Last 3 Encounters:   07/27/23 122/70   05/16/22 122/78   04/08/22 (!) 130/92       Recent Lab results:  Lab Results   Component Value Date    CHOL 300 (H) 05/20/2022   ,   Lab Results   Component Value Date    HDL 79 05/20/2022   ,   Lab Results   Component Value Date    LDLCALC 189 (H) 05/20/2022   ,   Lab Results    Component Value Date    TRIG 175 (H) 05/20/2022        Lab Results   Component Value Date    GLUCOSE 104 (H) 05/20/2022   , No results found for: HGBA1C      Lab Results   Component Value Date    CREATININE 0.79 05/20/2022       Lab Results   Component Value Date    TSH 1.060 05/20/2022         No results found for: HGBA1C

## 2023-08-17 ENCOUNTER — HOSPITAL ENCOUNTER (OUTPATIENT)
Dept: RADIOLOGY | Age: 58
Discharge: HOME | End: 2023-08-17
Attending: RADIOLOGY
Payer: COMMERCIAL

## 2023-08-17 DIAGNOSIS — R92.8 ABNORMAL MAMMOGRAM: ICD-10-CM

## 2023-08-17 PROCEDURE — 76642 ULTRASOUND BREAST LIMITED: CPT | Mod: LT

## 2023-08-17 PROCEDURE — G0279 TOMOSYNTHESIS, MAMMO: HCPCS | Mod: LT

## 2023-08-22 ENCOUNTER — HOSPITAL ENCOUNTER (OUTPATIENT)
Dept: RADIOLOGY | Age: 58
Discharge: HOME | End: 2023-08-22
Attending: ORTHOPAEDIC SURGERY
Payer: COMMERCIAL

## 2023-08-22 DIAGNOSIS — S43.432A SUPERIOR GLENOID LABRUM LESION OF LEFT SHOULDER, INITIAL ENCOUNTER: ICD-10-CM

## 2023-08-29 ENCOUNTER — TELEPHONE (OUTPATIENT)
Dept: FAMILY MEDICINE | Facility: CLINIC | Age: 58
End: 2023-08-29
Payer: COMMERCIAL

## 2023-08-29 NOTE — TELEPHONE ENCOUNTER
REFILL LINE:    Patient left message she needs her oxycodone.     Called patient.    Patient states [facility] gave her a script for oxycodone 0.5 mg, take 1 tablet in the morning and then 1 tablet every 4 hours as needed.    [facility] said to have PCP order refills.    Script can be sent to [facility]    Please advise

## 2023-08-30 ENCOUNTER — TRANSCRIBE ORDERS (OUTPATIENT)
Dept: SCHEDULING | Age: 58
End: 2023-08-30

## 2023-08-30 DIAGNOSIS — M87.00 IDIOPATHIC ASEPTIC NECROSIS OF UNSPECIFIED BONE (CMS/HCC): Primary | ICD-10-CM

## 2023-08-30 RX ORDER — OXYCODONE HYDROCHLORIDE 5 MG/1
5 TABLET ORAL EVERY 4 HOURS PRN
Qty: 30 TABLET | Refills: 0 | Status: SHIPPED | OUTPATIENT
Start: 2023-08-30 | End: 2023-09-09

## 2023-09-12 RX ORDER — LORAZEPAM 0.5 MG/1
TABLET ORAL
Qty: 90 TABLET | Refills: 0 | Status: SHIPPED | OUTPATIENT
Start: 2023-09-12 | End: 2023-10-16

## 2023-09-14 RX ORDER — TRAZODONE HYDROCHLORIDE 100 MG/1
100 TABLET ORAL DAILY
Qty: 90 TABLET | Refills: 1 | Status: SHIPPED | OUTPATIENT
Start: 2023-09-14 | End: 2024-02-22

## 2023-10-16 RX ORDER — LORAZEPAM 0.5 MG/1
TABLET ORAL
Qty: 90 TABLET | Refills: 0 | Status: SHIPPED | OUTPATIENT
Start: 2023-10-16 | End: 2023-10-18 | Stop reason: SDUPTHER

## 2023-10-18 RX ORDER — LORAZEPAM 0.5 MG/1
0.5 TABLET ORAL 3 TIMES DAILY PRN
Qty: 15 TABLET | Refills: 0 | Status: SHIPPED | OUTPATIENT
Start: 2023-10-18 | End: 2023-10-25

## 2023-10-25 RX ORDER — LORAZEPAM 0.5 MG/1
TABLET ORAL
Qty: 90 TABLET | Refills: 0 | Status: SHIPPED | OUTPATIENT
Start: 2023-10-25 | End: 2023-12-04

## 2023-10-25 RX ORDER — OXYCODONE HYDROCHLORIDE 10 MG/1
10 TABLET ORAL EVERY 8 HOURS PRN
Qty: 30 TABLET | Refills: 0 | Status: SHIPPED | OUTPATIENT
Start: 2023-10-25 | End: 2023-11-04

## 2023-10-26 ENCOUNTER — TELEPHONE (OUTPATIENT)
Dept: FAMILY MEDICINE | Facility: CLINIC | Age: 58
End: 2023-10-26
Payer: COMMERCIAL

## 2023-10-26 NOTE — TELEPHONE ENCOUNTER
[facility] mail order pharmacy called stating they can only give 7 day supply of the oxycodone due to insurance limit.  Any questions call [phone] ref# [id number]

## 2023-11-29 RX ORDER — TIZANIDINE 4 MG/1
TABLET ORAL
Qty: 270 TABLET | Refills: 3 | Status: SHIPPED | OUTPATIENT
Start: 2023-11-29

## 2023-11-29 NOTE — TELEPHONE ENCOUNTER
Medicine Refill Request    Last Office Visit: 7/27/2023   Last Consult Visit: Visit date not found  Last Telemedicine Visit: 5/11/2023 Jack Hensley MD    Next Appointment: Visit date not found      Current Outpatient Medications:     albuterol HFA (VENTOLIN HFA) 90 mcg/actuation inhaler, USE 2 INHALATIONS EVERY 6 HOURS AS NEEDED FOR WHEEZING, Disp: 25.5 g, Rfl: 3    cloNIDine (CATAPRES) 0.1 mg tablet, clonidine HCl 0.1 mg tablet, Disp: , Rfl:     fluticasone propionate (FLONASE) 50 mcg/actuation nasal spray, USE 2 SPRAYS NASALLY IN THE MORNING, Disp: 16 g, Rfl: 11    gabapentin (NEURONTIN) 300 mg capsule, Take 1 capsule (300 mg total) by mouth 2 (two) times a day., Disp: 180 capsule, Rfl: 3    levothyroxine (SYNTHROID) 88 mcg tablet, TAKE 1 TABLET DAILY, Disp: 90 tablet, Rfl: 3    LORazepam (ATIVAN) 0.5 mg tablet, TAKE 1 TABLET EVERY MORNING AND 2 TABLETS NIGHTLY, Disp: 90 tablet, Rfl: 0    losartan (COZAAR) 100 mg tablet, Take 1 tablet (100 mg total) by mouth daily., Disp: 90 tablet, Rfl: 3    metoprolol tartrate (LOPRESSOR) 25 mg tablet, Take 1 tablet (25 mg total) by mouth 2 (two) times a day., Disp: 180 tablet, Rfl: 3    tiZANidine (ZANAFLEX) 4 mg tablet, TAKE 1 TABLET THREE TIMES A DAY, Disp: 270 tablet, Rfl: 3    traZODone (DESYREL) 100 mg tablet, Take 1 tablet (100 mg total) by mouth daily., Disp: 90 tablet, Rfl: 1    venlafaxine (EFFEXOR) 100 mg tablet, Take 1.5 tablets (150 mg total) by mouth 2 (two) times a day., Disp: 180 tablet, Rfl: 1      BP Readings from Last 3 Encounters:   07/27/23 122/70   05/16/22 122/78   04/08/22 (!) 130/92       Recent Lab results:  Lab Results   Component Value Date    CHOL 300 (H) 05/20/2022   ,   Lab Results   Component Value Date    HDL 79 05/20/2022   ,   Lab Results   Component Value Date    LDLCALC 189 (H) 05/20/2022   ,   Lab Results   Component Value Date    TRIG 175 (H) 05/20/2022        Lab Results   Component Value Date    GLUCOSE 104 (H) 05/20/2022  "  , No results found for: \"HGBA1C\"      Lab Results   Component Value Date    CREATININE 0.79 05/20/2022       Lab Results   Component Value Date    TSH 1.060 05/20/2022         No results found for: \"HGBA1C\"  "

## 2023-12-04 RX ORDER — LORAZEPAM 0.5 MG/1
TABLET ORAL
Qty: 90 TABLET | Refills: 0 | Status: SHIPPED | OUTPATIENT
Start: 2023-12-04 | End: 2024-01-25 | Stop reason: SDUPTHER

## 2023-12-04 RX ORDER — VENLAFAXINE 100 MG/1
TABLET ORAL
Qty: 180 TABLET | Refills: 5 | Status: SHIPPED | OUTPATIENT
Start: 2023-12-04 | End: 2024-01-26 | Stop reason: SDUPTHER

## 2023-12-04 RX ORDER — FLUTICASONE PROPIONATE 50 MCG
SPRAY, SUSPENSION (ML) NASAL
Qty: 16 G | Refills: 11 | Status: SHIPPED | OUTPATIENT
Start: 2023-12-04

## 2023-12-04 NOTE — TELEPHONE ENCOUNTER
Medicine Refill Request    Last Office Visit: 7/27/2023   Last Consult Visit: Visit date not found  Last Telemedicine Visit: 5/11/2023 Jack Hensley MD    Next Appointment: Visit date not found      Current Outpatient Medications:   •  albuterol HFA (VENTOLIN HFA) 90 mcg/actuation inhaler, USE 2 INHALATIONS EVERY 6 HOURS AS NEEDED FOR WHEEZING, Disp: 25.5 g, Rfl: 3  •  cloNIDine (CATAPRES) 0.1 mg tablet, clonidine HCl 0.1 mg tablet, Disp: , Rfl:   •  fluticasone propionate (FLONASE) 50 mcg/actuation nasal spray, USE 2 SPRAYS NASALLY IN THE MORNING, Disp: 16 g, Rfl: 11  •  gabapentin (NEURONTIN) 300 mg capsule, Take 1 capsule (300 mg total) by mouth 2 (two) times a day., Disp: 180 capsule, Rfl: 3  •  levothyroxine (SYNTHROID) 88 mcg tablet, TAKE 1 TABLET DAILY, Disp: 90 tablet, Rfl: 3  •  LORazepam (ATIVAN) 0.5 mg tablet, TAKE 1 TABLET EVERY MORNING AND 2 TABLETS NIGHTLY, Disp: 90 tablet, Rfl: 0  •  losartan (COZAAR) 100 mg tablet, Take 1 tablet (100 mg total) by mouth daily., Disp: 90 tablet, Rfl: 3  •  metoprolol tartrate (LOPRESSOR) 25 mg tablet, Take 1 tablet (25 mg total) by mouth 2 (two) times a day., Disp: 180 tablet, Rfl: 3  •  tiZANidine (ZANAFLEX) 4 mg tablet, TAKE 1 TABLET THREE TIMES A DAY, Disp: 270 tablet, Rfl: 3  •  traZODone (DESYREL) 100 mg tablet, Take 1 tablet (100 mg total) by mouth daily., Disp: 90 tablet, Rfl: 1  •  venlafaxine (EFFEXOR) 100 mg tablet, Take 1.5 tablets (150 mg total) by mouth 2 (two) times a day., Disp: 180 tablet, Rfl: 1      BP Readings from Last 3 Encounters:   07/27/23 122/70   05/16/22 122/78   04/08/22 (!) 130/92       Recent Lab results:  Lab Results   Component Value Date    CHOL 300 (H) 05/20/2022   ,   Lab Results   Component Value Date    HDL 79 05/20/2022   ,   Lab Results   Component Value Date    LDLCALC 189 (H) 05/20/2022   ,   Lab Results   Component Value Date    TRIG 175 (H) 05/20/2022        Lab Results   Component Value Date    GLUCOSE 104 (H) 05/20/2022  "  , No results found for: \"HGBA1C\"      Lab Results   Component Value Date    CREATININE 0.79 05/20/2022       Lab Results   Component Value Date    TSH 1.060 05/20/2022         No results found for: \"HGBA1C\"  "

## 2024-01-25 DIAGNOSIS — I10 PRIMARY HYPERTENSION: ICD-10-CM

## 2024-01-26 RX ORDER — CLONIDINE HYDROCHLORIDE 0.1 MG/1
TABLET ORAL
Qty: 60 TABLET | Refills: 0 | Status: SHIPPED | OUTPATIENT
Start: 2024-01-26

## 2024-01-26 RX ORDER — LOSARTAN POTASSIUM 100 MG/1
100 TABLET ORAL DAILY
Qty: 90 TABLET | Refills: 3 | Status: SHIPPED | OUTPATIENT
Start: 2024-01-26

## 2024-01-26 RX ORDER — METOPROLOL TARTRATE 25 MG/1
25 TABLET, FILM COATED ORAL 2 TIMES DAILY
Qty: 180 TABLET | Refills: 3 | Status: SHIPPED | OUTPATIENT
Start: 2024-01-26

## 2024-01-26 RX ORDER — VENLAFAXINE 100 MG/1
TABLET ORAL
Qty: 90 TABLET | Refills: 0 | Status: SHIPPED | OUTPATIENT
Start: 2024-01-26

## 2024-01-26 RX ORDER — LORAZEPAM 0.5 MG/1
TABLET ORAL
Qty: 90 TABLET | Refills: 0 | Status: SHIPPED | OUTPATIENT
Start: 2024-01-26 | End: 2024-02-27

## 2024-01-26 NOTE — TELEPHONE ENCOUNTER
Patient comment: Dr Hensley we haven’t been home long enough to get back in with a provider. Please take care of us a few months. I’ve been taking this medication for over 7 years. It actually works and helps curb horrific anxiety attacks. I’ve been treated for them for over 25 years. The combination of this,  Effexor, and the blood pressure meds have helped me live a normal life. I can’t become unstable after all of the work and experimenting with other meds. My request on Jan 9th was canceled by your office.     Medicine Refill Request    Last Office Visit: 7/27/2023   Last Consult Visit: Visit date not found  Last Telemedicine Visit: 5/11/2023 Jack Hensley MD    Next Appointment: Visit date not found      Current Outpatient Medications:   •  albuterol HFA (VENTOLIN HFA) 90 mcg/actuation inhaler, USE 2 INHALATIONS EVERY 6 HOURS AS NEEDED FOR WHEEZING, Disp: 25.5 g, Rfl: 3  •  cloNIDine (CATAPRES) 0.1 mg tablet, clonidine HCl 0.1 mg tablet, Disp: , Rfl:   •  fluticasone propionate (FLONASE) 50 mcg/actuation nasal spray, USE 2 SPRAYS NASALLY IN THE MORNING, Disp: 16 g, Rfl: 11  •  gabapentin (NEURONTIN) 300 mg capsule, Take 1 capsule (300 mg total) by mouth 2 (two) times a day., Disp: 180 capsule, Rfl: 3  •  levothyroxine (SYNTHROID) 88 mcg tablet, TAKE 1 TABLET DAILY, Disp: 90 tablet, Rfl: 3  •  LORazepam (ATIVAN) 0.5 mg tablet, TAKE 1 TABLET EVERY MORNING AND 2 TABLETS NIGHTLY, Disp: 90 tablet, Rfl: 0  •  losartan (COZAAR) 100 mg tablet, Take 1 tablet (100 mg total) by mouth daily., Disp: 90 tablet, Rfl: 3  •  metoprolol tartrate (LOPRESSOR) 25 mg tablet, Take 1 tablet (25 mg total) by mouth 2 (two) times a day., Disp: 180 tablet, Rfl: 3  •  tiZANidine (ZANAFLEX) 4 mg tablet, TAKE 1 TABLET THREE TIMES A DAY, Disp: 270 tablet, Rfl: 3  •  traZODone (DESYREL) 100 mg tablet, Take 1 tablet (100 mg total) by mouth daily., Disp: 90 tablet, Rfl: 1  •  venlafaxine (EFFEXOR) 100 mg tablet, TAKE ONE AND ONE-HALF TABLETS  "TWICE A DAY, Disp: 180 tablet, Rfl: 5      BP Readings from Last 3 Encounters:   07/27/23 122/70   05/16/22 122/78   04/08/22 (!) 130/92       Recent Lab results:  Lab Results   Component Value Date    CHOL 300 (H) 05/20/2022   ,   Lab Results   Component Value Date    HDL 79 05/20/2022   ,   Lab Results   Component Value Date    LDLCALC 189 (H) 05/20/2022   ,   Lab Results   Component Value Date    TRIG 175 (H) 05/20/2022        Lab Results   Component Value Date    GLUCOSE 104 (H) 05/20/2022   , No results found for: \"HGBA1C\"      Lab Results   Component Value Date    CREATININE 0.79 05/20/2022       Lab Results   Component Value Date    TSH 1.060 05/20/2022         No results found for: \"HGBA1C\"  "

## 2024-02-08 DIAGNOSIS — E03.9 HYPOTHYROIDISM, UNSPECIFIED TYPE: ICD-10-CM

## 2024-02-08 RX ORDER — LEVOTHYROXINE SODIUM 88 UG/1
TABLET ORAL
Qty: 90 TABLET | Refills: 3 | Status: SHIPPED | OUTPATIENT
Start: 2024-02-08

## 2024-02-08 NOTE — TELEPHONE ENCOUNTER
Medicine Refill Request    Last Office Visit: 7/27/2023   Last Consult Visit: Visit date not found  Last Telemedicine Visit: 5/11/2023 Jack Hensley MD    Next Appointment: Visit date not found      Current Outpatient Medications:   •  albuterol HFA (VENTOLIN HFA) 90 mcg/actuation inhaler, USE 2 INHALATIONS EVERY 6 HOURS AS NEEDED FOR WHEEZING, Disp: 25.5 g, Rfl: 3  •  cloNIDine (CATAPRES) 0.1 mg tablet, clonidine HCl 0.1 mg tablet, Disp: 60 tablet, Rfl: 0  •  fluticasone propionate (FLONASE) 50 mcg/actuation nasal spray, USE 2 SPRAYS NASALLY IN THE MORNING, Disp: 16 g, Rfl: 11  •  gabapentin (NEURONTIN) 300 mg capsule, Take 1 capsule (300 mg total) by mouth 2 (two) times a day., Disp: 180 capsule, Rfl: 3  •  levothyroxine (SYNTHROID) 88 mcg tablet, TAKE 1 TABLET DAILY, Disp: 90 tablet, Rfl: 3  •  LORazepam (ATIVAN) 0.5 mg tablet, Take 1 tablet (0.5 mg total) by mouth every morning AND 2 tablets (1 mg total) nightly., Disp: 90 tablet, Rfl: 0  •  losartan (COZAAR) 100 mg tablet, Take 1 tablet (100 mg total) by mouth daily., Disp: 90 tablet, Rfl: 3  •  metoprolol tartrate (LOPRESSOR) 25 mg tablet, Take 1 tablet (25 mg total) by mouth 2 (two) times a day., Disp: 180 tablet, Rfl: 3  •  tiZANidine (ZANAFLEX) 4 mg tablet, TAKE 1 TABLET THREE TIMES A DAY, Disp: 270 tablet, Rfl: 3  •  traZODone (DESYREL) 100 mg tablet, Take 1 tablet (100 mg total) by mouth daily., Disp: 90 tablet, Rfl: 1  •  venlafaxine (EFFEXOR) 100 mg tablet, TAKE ONE AND ONE-HALF TABLETS TWICE A DAY, Disp: 90 tablet, Rfl: 0      BP Readings from Last 3 Encounters:   07/27/23 122/70   05/16/22 122/78   04/08/22 (!) 130/92       Recent Lab results:  Lab Results   Component Value Date    CHOL 300 (H) 05/20/2022   ,   Lab Results   Component Value Date    HDL 79 05/20/2022   ,   Lab Results   Component Value Date    LDLCALC 189 (H) 05/20/2022   ,   Lab Results   Component Value Date    TRIG 175 (H) 05/20/2022        Lab Results   Component Value Date     "GLUCOSE 104 (H) 05/20/2022   , No results found for: \"HGBA1C\"      Lab Results   Component Value Date    CREATININE 0.79 05/20/2022       Lab Results   Component Value Date    TSH 1.060 05/20/2022         No results found for: \"HGBA1C\"  "

## 2024-02-22 RX ORDER — TRAZODONE HYDROCHLORIDE 100 MG/1
100 TABLET ORAL DAILY
Qty: 90 TABLET | Refills: 3 | Status: SHIPPED | OUTPATIENT
Start: 2024-02-22

## 2024-02-22 NOTE — TELEPHONE ENCOUNTER
Medicine Refill Request    Last Office Visit: 7/27/2023   Last Consult Visit: Visit date not found  Last Telemedicine Visit: 5/11/2023 Jack Hensley MD    Next Appointment: Visit date not found      Current Outpatient Medications:     albuterol HFA (VENTOLIN HFA) 90 mcg/actuation inhaler, USE 2 INHALATIONS EVERY 6 HOURS AS NEEDED FOR WHEEZING, Disp: 25.5 g, Rfl: 3    cloNIDine (CATAPRES) 0.1 mg tablet, clonidine HCl 0.1 mg tablet, Disp: 60 tablet, Rfl: 0    fluticasone propionate (FLONASE) 50 mcg/actuation nasal spray, USE 2 SPRAYS NASALLY IN THE MORNING, Disp: 16 g, Rfl: 11    gabapentin (NEURONTIN) 300 mg capsule, Take 1 capsule (300 mg total) by mouth 2 (two) times a day., Disp: 180 capsule, Rfl: 3    levothyroxine (SYNTHROID) 88 mcg tablet, TAKE 1 TABLET DAILY, Disp: 90 tablet, Rfl: 3    LORazepam (ATIVAN) 0.5 mg tablet, Take 1 tablet (0.5 mg total) by mouth every morning AND 2 tablets (1 mg total) nightly., Disp: 90 tablet, Rfl: 0    losartan (COZAAR) 100 mg tablet, Take 1 tablet (100 mg total) by mouth daily., Disp: 90 tablet, Rfl: 3    metoprolol tartrate (LOPRESSOR) 25 mg tablet, Take 1 tablet (25 mg total) by mouth 2 (two) times a day., Disp: 180 tablet, Rfl: 3    tiZANidine (ZANAFLEX) 4 mg tablet, TAKE 1 TABLET THREE TIMES A DAY, Disp: 270 tablet, Rfl: 3    traZODone (DESYREL) 100 mg tablet, Take 1 tablet (100 mg total) by mouth daily., Disp: 90 tablet, Rfl: 1    venlafaxine (EFFEXOR) 100 mg tablet, TAKE ONE AND ONE-HALF TABLETS TWICE A DAY, Disp: 90 tablet, Rfl: 0      BP Readings from Last 3 Encounters:   07/27/23 122/70   05/16/22 122/78   04/08/22 (!) 130/92       Recent Lab results:  Lab Results   Component Value Date    CHOL 300 (H) 05/20/2022   ,   Lab Results   Component Value Date    HDL 79 05/20/2022   ,   Lab Results   Component Value Date    LDLCALC 189 (H) 05/20/2022   ,   Lab Results   Component Value Date    TRIG 175 (H) 05/20/2022        Lab Results   Component Value Date    GLUCOSE 104  "(H) 05/20/2022   , No results found for: \"HGBA1C\"      Lab Results   Component Value Date    CREATININE 0.79 05/20/2022       Lab Results   Component Value Date    TSH 1.060 05/20/2022         No results found for: \"HGBA1C\"  "

## 2024-02-27 RX ORDER — LORAZEPAM 0.5 MG/1
TABLET ORAL
Qty: 90 TABLET | Refills: 0 | Status: SHIPPED | OUTPATIENT
Start: 2024-02-27 | End: 2024-04-04

## 2024-04-04 RX ORDER — LORAZEPAM 0.5 MG/1
TABLET ORAL
Qty: 90 TABLET | Refills: 0 | Status: SHIPPED | OUTPATIENT
Start: 2024-04-04

## 2024-04-04 NOTE — TELEPHONE ENCOUNTER
Medicine Refill Request    Last Office Visit: 7/27/2023   Last Consult Visit: Visit date not found  Last Telemedicine Visit: 5/11/2023 Jack Hensley MD    Next Appointment: Visit date not found      Current Outpatient Medications:     albuterol HFA (VENTOLIN HFA) 90 mcg/actuation inhaler, USE 2 INHALATIONS EVERY 6 HOURS AS NEEDED FOR WHEEZING, Disp: 25.5 g, Rfl: 3    cloNIDine (CATAPRES) 0.1 mg tablet, clonidine HCl 0.1 mg tablet, Disp: 60 tablet, Rfl: 0    fluticasone propionate (FLONASE) 50 mcg/actuation nasal spray, USE 2 SPRAYS NASALLY IN THE MORNING, Disp: 16 g, Rfl: 11    gabapentin (NEURONTIN) 300 mg capsule, Take 1 capsule (300 mg total) by mouth 2 (two) times a day., Disp: 180 capsule, Rfl: 3    levothyroxine (SYNTHROID) 88 mcg tablet, TAKE 1 TABLET DAILY, Disp: 90 tablet, Rfl: 3    LORazepam (ATIVAN) 0.5 mg tablet, Take 1 tablet (0.5 mg total) by mouth every morning AND 2 tablets (1 mg total) nightly., Disp: 90 tablet, Rfl: 0    losartan (COZAAR) 100 mg tablet, Take 1 tablet (100 mg total) by mouth daily., Disp: 90 tablet, Rfl: 3    metoprolol tartrate (LOPRESSOR) 25 mg tablet, Take 1 tablet (25 mg total) by mouth 2 (two) times a day., Disp: 180 tablet, Rfl: 3    tiZANidine (ZANAFLEX) 4 mg tablet, TAKE 1 TABLET THREE TIMES A DAY, Disp: 270 tablet, Rfl: 3    traZODone (DESYREL) 100 mg tablet, TAKE 1 TABLET DAILY, Disp: 90 tablet, Rfl: 3    venlafaxine (EFFEXOR) 100 mg tablet, TAKE ONE AND ONE-HALF TABLETS TWICE A DAY, Disp: 90 tablet, Rfl: 0      BP Readings from Last 3 Encounters:   07/27/23 122/70   05/16/22 122/78   04/08/22 (!) 130/92       Recent Lab results:  Lab Results   Component Value Date    CHOL 300 (H) 05/20/2022   ,   Lab Results   Component Value Date    HDL 79 05/20/2022   ,   Lab Results   Component Value Date    LDLCALC 189 (H) 05/20/2022   ,   Lab Results   Component Value Date    TRIG 175 (H) 05/20/2022        Lab Results   Component Value Date    GLUCOSE 104 (H) 05/20/2022   , No  "results found for: \"HGBA1C\"      Lab Results   Component Value Date    CREATININE 0.79 05/20/2022       Lab Results   Component Value Date    TSH 1.060 05/20/2022         No results found for: \"HGBA1C\"  "

## 2024-07-10 RX ORDER — GABAPENTIN 300 MG/1
300 CAPSULE ORAL 2 TIMES DAILY
Qty: 180 CAPSULE | Refills: 3 | Status: SHIPPED | OUTPATIENT
Start: 2024-07-10